# Patient Record
Sex: FEMALE | Race: WHITE | Employment: FULL TIME
[De-identification: names, ages, dates, MRNs, and addresses within clinical notes are randomized per-mention and may not be internally consistent; named-entity substitution may affect disease eponyms.]

---

## 2022-10-11 PROBLEM — E66.812 CLASS 2 OBESITY DUE TO EXCESS CALORIES WITHOUT SERIOUS COMORBIDITY WITH BODY MASS INDEX (BMI) OF 38.0 TO 38.9 IN ADULT: Status: ACTIVE | Noted: 2022-10-11

## 2024-09-03 ENCOUNTER — OFFICE VISIT (OUTPATIENT)
Dept: FAMILY MEDICINE CLINIC | Facility: CLINIC | Age: 34
End: 2024-09-03

## 2024-09-03 VITALS
BODY MASS INDEX: 37.61 KG/M2 | DIASTOLIC BLOOD PRESSURE: 71 MMHG | HEART RATE: 90 BPM | WEIGHT: 212.25 LBS | TEMPERATURE: 97.3 F | SYSTOLIC BLOOD PRESSURE: 114 MMHG | HEIGHT: 63 IN | OXYGEN SATURATION: 93 %

## 2024-09-03 DIAGNOSIS — G56.03 CARPAL TUNNEL SYNDROME, BILATERAL: ICD-10-CM

## 2024-09-03 DIAGNOSIS — M25.532 BILATERAL WRIST PAIN: Primary | ICD-10-CM

## 2024-09-03 DIAGNOSIS — L02.211 CUTANEOUS ABSCESS OF ABDOMINAL WALL: ICD-10-CM

## 2024-09-03 DIAGNOSIS — M25.531 BILATERAL WRIST PAIN: Primary | ICD-10-CM

## 2024-09-03 DIAGNOSIS — R20.2 PARESTHESIA OF HAND, BILATERAL: ICD-10-CM

## 2024-09-03 PROCEDURE — 99214 OFFICE O/P EST MOD 30 MIN: CPT | Performed by: PHYSICIAN ASSISTANT

## 2024-09-03 RX ORDER — SULFAMETHOXAZOLE/TRIMETHOPRIM 800-160 MG
1 TABLET ORAL 2 TIMES DAILY
Qty: 14 TABLET | Refills: 0 | Status: SHIPPED | OUTPATIENT
Start: 2024-09-03 | End: 2024-09-10

## 2024-09-03 RX ORDER — FLUTICASONE PROPIONATE 50 MCG
1 SPRAY, SUSPENSION (ML) NASAL 2 TIMES DAILY PRN
COMMUNITY
Start: 2024-07-02

## 2024-09-03 SDOH — ECONOMIC STABILITY: INCOME INSECURITY: HOW HARD IS IT FOR YOU TO PAY FOR THE VERY BASICS LIKE FOOD, HOUSING, MEDICAL CARE, AND HEATING?: NOT HARD AT ALL

## 2024-09-03 SDOH — ECONOMIC STABILITY: FOOD INSECURITY: WITHIN THE PAST 12 MONTHS, YOU WORRIED THAT YOUR FOOD WOULD RUN OUT BEFORE YOU GOT MONEY TO BUY MORE.: NEVER TRUE

## 2024-09-03 SDOH — ECONOMIC STABILITY: FOOD INSECURITY: WITHIN THE PAST 12 MONTHS, THE FOOD YOU BOUGHT JUST DIDN'T LAST AND YOU DIDN'T HAVE MONEY TO GET MORE.: NEVER TRUE

## 2024-09-03 ASSESSMENT — ENCOUNTER SYMPTOMS
ABDOMINAL PAIN: 0
RESPIRATORY NEGATIVE: 1
DIARRHEA: 0
VOMITING: 0
NAUSEA: 0

## 2024-09-03 ASSESSMENT — PATIENT HEALTH QUESTIONNAIRE - PHQ9
SUM OF ALL RESPONSES TO PHQ9 QUESTIONS 1 & 2: 0
1. LITTLE INTEREST OR PLEASURE IN DOING THINGS: NOT AT ALL
SUM OF ALL RESPONSES TO PHQ QUESTIONS 1-9: 0
2. FEELING DOWN, DEPRESSED OR HOPELESS: NOT AT ALL
SUM OF ALL RESPONSES TO PHQ QUESTIONS 1-9: 0
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
## 2025-03-03 SDOH — HEALTH STABILITY: PHYSICAL HEALTH: ON AVERAGE, HOW MANY DAYS PER WEEK DO YOU ENGAGE IN MODERATE TO STRENUOUS EXERCISE (LIKE A BRISK WALK)?: 4 DAYS

## 2025-03-03 SDOH — HEALTH STABILITY: PHYSICAL HEALTH: ON AVERAGE, HOW MANY MINUTES DO YOU ENGAGE IN EXERCISE AT THIS LEVEL?: 40 MIN

## 2025-03-04 ENCOUNTER — OFFICE VISIT (OUTPATIENT)
Dept: PRIMARY CARE CLINIC | Facility: CLINIC | Age: 35
End: 2025-03-04
Payer: COMMERCIAL

## 2025-03-04 ENCOUNTER — PATIENT MESSAGE (OUTPATIENT)
Dept: PRIMARY CARE CLINIC | Facility: CLINIC | Age: 35
End: 2025-03-04

## 2025-03-04 VITALS
BODY MASS INDEX: 39.69 KG/M2 | WEIGHT: 224 LBS | HEIGHT: 63 IN | SYSTOLIC BLOOD PRESSURE: 109 MMHG | TEMPERATURE: 97.9 F | DIASTOLIC BLOOD PRESSURE: 72 MMHG | OXYGEN SATURATION: 95 % | HEART RATE: 91 BPM

## 2025-03-04 DIAGNOSIS — R53.83 OTHER FATIGUE: ICD-10-CM

## 2025-03-04 DIAGNOSIS — Z13.21 ENCOUNTER FOR VITAMIN DEFICIENCY SCREENING: ICD-10-CM

## 2025-03-04 DIAGNOSIS — E55.9 VITAMIN D DEFICIENCY: ICD-10-CM

## 2025-03-04 DIAGNOSIS — R79.9 ABNORMAL BLOOD CHEMISTRY: ICD-10-CM

## 2025-03-04 DIAGNOSIS — Z13.29 SCREENING FOR THYROID DISORDER: ICD-10-CM

## 2025-03-04 DIAGNOSIS — Z13.1 SCREENING FOR DIABETES MELLITUS: ICD-10-CM

## 2025-03-04 DIAGNOSIS — Z13.220 SCREENING FOR LIPID DISORDERS: ICD-10-CM

## 2025-03-04 DIAGNOSIS — R53.81 MALAISE AND FATIGUE: ICD-10-CM

## 2025-03-04 DIAGNOSIS — Z13.0 SCREENING FOR IRON DEFICIENCY ANEMIA: ICD-10-CM

## 2025-03-04 DIAGNOSIS — E66.09 CLASS 2 OBESITY DUE TO EXCESS CALORIES WITHOUT SERIOUS COMORBIDITY WITH BODY MASS INDEX (BMI) OF 38.0 TO 38.9 IN ADULT: ICD-10-CM

## 2025-03-04 DIAGNOSIS — Z79.899 ENCOUNTER FOR LONG-TERM (CURRENT) USE OF MEDICATIONS: ICD-10-CM

## 2025-03-04 DIAGNOSIS — R79.89 OTHER SPECIFIED ABNORMAL FINDINGS OF BLOOD CHEMISTRY: ICD-10-CM

## 2025-03-04 DIAGNOSIS — R53.81 MALAISE: ICD-10-CM

## 2025-03-04 DIAGNOSIS — R73.09 OTHER ABNORMAL GLUCOSE: ICD-10-CM

## 2025-03-04 DIAGNOSIS — F41.9 ANXIETY AND DEPRESSION: ICD-10-CM

## 2025-03-04 DIAGNOSIS — R79.0 LOW MAGNESIUM LEVEL: ICD-10-CM

## 2025-03-04 DIAGNOSIS — F32.A ANXIETY AND DEPRESSION: ICD-10-CM

## 2025-03-04 DIAGNOSIS — Z76.89 ENCOUNTER TO ESTABLISH CARE: Primary | ICD-10-CM

## 2025-03-04 DIAGNOSIS — G89.29 CHRONIC RIGHT-SIDED LOW BACK PAIN WITH RIGHT-SIDED SCIATICA: ICD-10-CM

## 2025-03-04 DIAGNOSIS — Z13.228 SCREENING FOR METABOLIC DISORDER: ICD-10-CM

## 2025-03-04 DIAGNOSIS — K21.9 GASTROESOPHAGEAL REFLUX DISEASE WITHOUT ESOPHAGITIS: ICD-10-CM

## 2025-03-04 DIAGNOSIS — Z13.21 SCREENING FOR ENDOCRINE, NUTRITIONAL, METABOLIC AND IMMUNITY DISORDER: ICD-10-CM

## 2025-03-04 DIAGNOSIS — M54.41 CHRONIC RIGHT-SIDED LOW BACK PAIN WITH RIGHT-SIDED SCIATICA: ICD-10-CM

## 2025-03-04 DIAGNOSIS — G43.109 MIGRAINE WITH AURA AND WITHOUT STATUS MIGRAINOSUS, NOT INTRACTABLE: ICD-10-CM

## 2025-03-04 DIAGNOSIS — Z13.228 SCREENING FOR ENDOCRINE, NUTRITIONAL, METABOLIC AND IMMUNITY DISORDER: ICD-10-CM

## 2025-03-04 DIAGNOSIS — Z13.0 SCREENING FOR DEFICIENCY ANEMIA: ICD-10-CM

## 2025-03-04 DIAGNOSIS — R53.82 CHRONIC FATIGUE: ICD-10-CM

## 2025-03-04 DIAGNOSIS — E66.812 CLASS 2 OBESITY DUE TO EXCESS CALORIES WITHOUT SERIOUS COMORBIDITY WITH BODY MASS INDEX (BMI) OF 38.0 TO 38.9 IN ADULT: ICD-10-CM

## 2025-03-04 DIAGNOSIS — Z13.29 SCREENING FOR ENDOCRINE, NUTRITIONAL, METABOLIC AND IMMUNITY DISORDER: ICD-10-CM

## 2025-03-04 DIAGNOSIS — R53.83 MALAISE AND FATIGUE: ICD-10-CM

## 2025-03-04 DIAGNOSIS — R59.9 ADENOPATHY: ICD-10-CM

## 2025-03-04 DIAGNOSIS — Z13.0 SCREENING FOR ENDOCRINE, NUTRITIONAL, METABOLIC AND IMMUNITY DISORDER: ICD-10-CM

## 2025-03-04 LAB
25(OH)D3 SERPL-MCNC: 23.3 NG/ML (ref 30–100)
ALBUMIN SERPL-MCNC: 3.7 G/DL (ref 3.5–5)
ALBUMIN/GLOB SERPL: 1.2 (ref 1–1.9)
ALP SERPL-CCNC: 73 U/L (ref 35–104)
ALT SERPL-CCNC: 36 U/L (ref 8–45)
ANION GAP SERPL CALC-SCNC: 10 MMOL/L (ref 7–16)
AST SERPL-CCNC: 25 U/L (ref 15–37)
BASOPHILS # BLD: 0.05 K/UL (ref 0–0.2)
BASOPHILS NFR BLD: 0.6 % (ref 0–2)
BILIRUB SERPL-MCNC: 0.2 MG/DL (ref 0–1.2)
BUN SERPL-MCNC: 13 MG/DL (ref 6–23)
CALCIUM SERPL-MCNC: 9.2 MG/DL (ref 8.8–10.2)
CHLORIDE SERPL-SCNC: 106 MMOL/L (ref 98–107)
CHOLEST SERPL-MCNC: 195 MG/DL (ref 0–200)
CO2 SERPL-SCNC: 26 MMOL/L (ref 20–29)
CREAT SERPL-MCNC: 0.7 MG/DL (ref 0.6–1.1)
DIFFERENTIAL METHOD BLD: NORMAL
EOSINOPHIL # BLD: 0.1 K/UL (ref 0–0.8)
EOSINOPHIL NFR BLD: 1.2 % (ref 0.5–7.8)
ERYTHROCYTE [DISTWIDTH] IN BLOOD BY AUTOMATED COUNT: 12.7 % (ref 11.9–14.6)
EST. AVERAGE GLUCOSE BLD GHB EST-MCNC: 99 MG/DL
FERRITIN SERPL-MCNC: 58 NG/ML (ref 8–388)
FOLATE SERPL-MCNC: 7.8 NG/ML (ref 3.1–17.5)
GLOBULIN SER CALC-MCNC: 3.1 G/DL (ref 2.3–3.5)
GLUCOSE SERPL-MCNC: 97 MG/DL (ref 70–99)
HBA1C MFR BLD: 5.1 % (ref 0–5.6)
HCT VFR BLD AUTO: 41.6 % (ref 35.8–46.3)
HDLC SERPL-MCNC: 52 MG/DL (ref 40–60)
HDLC SERPL: 3.8 (ref 0–5)
HGB BLD-MCNC: 13.9 G/DL (ref 11.7–15.4)
IMM GRANULOCYTES # BLD AUTO: 0.03 K/UL (ref 0–0.5)
IMM GRANULOCYTES NFR BLD AUTO: 0.4 % (ref 0–5)
IRON SATN MFR SERPL: 16 % (ref 20–50)
IRON SERPL-MCNC: 52 UG/DL (ref 35–100)
LDLC SERPL CALC-MCNC: 112 MG/DL (ref 0–100)
LYMPHOCYTES # BLD: 1.84 K/UL (ref 0.5–4.6)
LYMPHOCYTES NFR BLD: 21.6 % (ref 13–44)
MAGNESIUM SERPL-MCNC: 2.1 MG/DL (ref 1.8–2.4)
MCH RBC QN AUTO: 31.9 PG (ref 26.1–32.9)
MCHC RBC AUTO-ENTMCNC: 33.4 G/DL (ref 31.4–35)
MCV RBC AUTO: 95.4 FL (ref 82–102)
MONOCYTES # BLD: 0.5 K/UL (ref 0.1–1.3)
MONOCYTES NFR BLD: 5.9 % (ref 4–12)
NEUTS SEG # BLD: 6.01 K/UL (ref 1.7–8.2)
NEUTS SEG NFR BLD: 70.3 % (ref 43–78)
NRBC # BLD: 0 K/UL (ref 0–0.2)
PLATELET # BLD AUTO: 287 K/UL (ref 150–450)
PMV BLD AUTO: 9.7 FL (ref 9.4–12.3)
POTASSIUM SERPL-SCNC: 4 MMOL/L (ref 3.5–5.1)
PROT SERPL-MCNC: 6.8 G/DL (ref 6.3–8.2)
RBC # BLD AUTO: 4.36 M/UL (ref 4.05–5.2)
SODIUM SERPL-SCNC: 141 MMOL/L (ref 136–145)
TIBC SERPL-MCNC: 318 UG/DL (ref 240–450)
TRIGL SERPL-MCNC: 157 MG/DL (ref 0–150)
TSH W FREE THYROID IF ABNORMAL: 1.1 UIU/ML (ref 0.27–4.2)
UIBC SERPL-MCNC: 266 UG/DL (ref 112–347)
VIT B12 SERPL-MCNC: 549 PG/ML (ref 193–986)
VLDLC SERPL CALC-MCNC: 31 MG/DL (ref 6–23)
WBC # BLD AUTO: 8.5 K/UL (ref 4.3–11.1)

## 2025-03-04 PROCEDURE — 99214 OFFICE O/P EST MOD 30 MIN: CPT | Performed by: NURSE PRACTITIONER

## 2025-03-04 RX ORDER — FAMOTIDINE 20 MG/1
20 TABLET, FILM COATED ORAL 2 TIMES DAILY PRN
Qty: 180 TABLET | Refills: 1 | Status: SHIPPED | OUTPATIENT
Start: 2025-03-04 | End: 2025-08-31

## 2025-03-04 SDOH — ECONOMIC STABILITY: FOOD INSECURITY: WITHIN THE PAST 12 MONTHS, YOU WORRIED THAT YOUR FOOD WOULD RUN OUT BEFORE YOU GOT MONEY TO BUY MORE.: NEVER TRUE

## 2025-03-04 SDOH — ECONOMIC STABILITY: FOOD INSECURITY: WITHIN THE PAST 12 MONTHS, THE FOOD YOU BOUGHT JUST DIDN'T LAST AND YOU DIDN'T HAVE MONEY TO GET MORE.: NEVER TRUE

## 2025-03-04 ASSESSMENT — ENCOUNTER SYMPTOMS
NAUSEA: 0
ABDOMINAL PAIN: 0
VOMITING: 0
CONSTIPATION: 0
EYES NEGATIVE: 1
SHORTNESS OF BREATH: 0
DIARRHEA: 0
CHEST TIGHTNESS: 0
BACK PAIN: 1
ALLERGIC/IMMUNOLOGIC NEGATIVE: 1
RESPIRATORY NEGATIVE: 1

## 2025-03-04 ASSESSMENT — PATIENT HEALTH QUESTIONNAIRE - PHQ9
SUM OF ALL RESPONSES TO PHQ QUESTIONS 1-9: 2
SUM OF ALL RESPONSES TO PHQ QUESTIONS 1-9: 2
3. TROUBLE FALLING OR STAYING ASLEEP: NOT AT ALL
9. THOUGHTS THAT YOU WOULD BE BETTER OFF DEAD, OR OF HURTING YOURSELF: NOT AT ALL
SUM OF ALL RESPONSES TO PHQ QUESTIONS 1-9: 2
SUM OF ALL RESPONSES TO PHQ QUESTIONS 1-9: 2
8. MOVING OR SPEAKING SO SLOWLY THAT OTHER PEOPLE COULD HAVE NOTICED. OR THE OPPOSITE, BEING SO FIGETY OR RESTLESS THAT YOU HAVE BEEN MOVING AROUND A LOT MORE THAN USUAL: NOT AT ALL
7. TROUBLE CONCENTRATING ON THINGS, SUCH AS READING THE NEWSPAPER OR WATCHING TELEVISION: NOT AT ALL
4. FEELING TIRED OR HAVING LITTLE ENERGY: NOT AT ALL
2. FEELING DOWN, DEPRESSED OR HOPELESS: SEVERAL DAYS
1. LITTLE INTEREST OR PLEASURE IN DOING THINGS: SEVERAL DAYS
10. IF YOU CHECKED OFF ANY PROBLEMS, HOW DIFFICULT HAVE THESE PROBLEMS MADE IT FOR YOU TO DO YOUR WORK, TAKE CARE OF THINGS AT HOME, OR GET ALONG WITH OTHER PEOPLE: SOMEWHAT DIFFICULT
6. FEELING BAD ABOUT YOURSELF - OR THAT YOU ARE A FAILURE OR HAVE LET YOURSELF OR YOUR FAMILY DOWN: NOT AT ALL
5. POOR APPETITE OR OVEREATING: NOT AT ALL

## 2025-03-04 NOTE — ASSESSMENT & PLAN NOTE
She has an appointment with Ortho for her hand/wrist.  She will reach out to them to see if she can be seen for her back without the new referral.  Stable.  Chronic.  Continue current treatment plan  Has tried diclofenac in the past    -------------------------------------------------------------------------------------------------------------------------------------   03/2023 - CT Lumbar Spine:    FINDINGS: There is preservation of vertebral body height and alignment. No   fracture is identified. Left and right periaortic lymph nodes are identified   with dominant right aortocaval lymph node measuring 1.0 x 0.7 cm, left   periaortic lymph node measuring 1.0 x 1.2 cm, left common iliac lymph node   measuring 1.0 x 0.6 cm without considerable change since previous exam.  At   L5-S1, there is a broad-based disc bulge and calcification along the margin of   the posterior disc. The lateral recesses appear patent.  At L4-5, there is a   mild broad-based disc bulge with mild lateral recess stenosis with mild facet   hypertrophic changes.  The L3-4, L2-3, L1-2 and T12-L1 vertebral body levels   appear normal. The paravertebral region appears unremarkable.     IMPRESSION:     1.  DISC DISEASE WITH CALCIFIED MARGIN IN THE L5-S1 DISC AS DESCRIBED SUGGESTIVE   OF A MORE CHRONIC PROCESS.  MILDER DISC SEEN AT L4-5.     2.  MILD STABLE RETROPERITONEAL ADENOPATHY.

## 2025-03-04 NOTE — ASSESSMENT & PLAN NOTE
Stable.  Chronic.  Interested in injectables but labs will be checked first  Has tried and failed diet, exercise, lifestyle changes

## 2025-03-04 NOTE — ASSESSMENT & PLAN NOTE
On famotidine 20 mg p.o. twice daily  Has not seen GI    Indigestion: Advised to take famotidine daily for a couple of weeks. Prescription sent to [facility].  - Consider endoscopy if symptoms persist.    GERD  Chronic. Stable/Well-Controlled  Labs: Mg, Vit B12 with annual labs.   Medication: See above  Discussed warning S&S r/t medication usage. Discussed SE, AR, AE.  Tolerating medication well. No SE, AR, AE. Benefits outweigh risks. Prefers to continue medication as currently prescribed.  Encourage appropriate rest and fluid intake  Encourage lifestyle changes, including healthy eating, exercise, limiting alcohol/tobacco use, and stress reducers.  F/u in 6 months for re-evaluation, unless an earlier f/u is required. If improvement is noted, we will provide additional refills.    General Measures  Elevate HOB  Avoid meals 2 to 3 hours before bedtime  Avoid stooping, bending, and tight-fitting garments  Avoid medications that relax LES (anticholinergic drugs; CCB)  Promoted weight loss  Avoid tobacco use and alcohol  Limit consumption of patient-specific food triggers  Track symptoms over time

## 2025-03-04 NOTE — PROGRESS NOTES
Chronic.  Continue current treatment plan  25. Migraine with aura and without status migrainosus, not intractable  Assessment & Plan:  Not on meds  Stable.  Chronic.  Continue current treatment plan        I have reviewed the patient's past medical history, social history and family history and vitals.  We have discussed treatment plan and follow up and given patient instructions.  Patient's questions are answered and we will follow up as indicated.    Return in about 3 months (around 6/4/2025) for VV is okay.     MALLIKA Hinojosa - BILL    ADDITIONAL EDUCATION    Last 7 days of labs:    No visits with results within 1 Week(s) from this visit.   Latest known visit with results is:   Orders Only on 04/06/2023   Component Date Value Ref Range Status    HIV 1/2 Interp 04/06/2023 NONREACTIVE  NONREACTIVE   Final    HIV 1/2 Result Comment 04/06/2023 SEE NOTE    Final    Comment: While this assay is highly sensitive, a non-reactive/negative result for HIV antibodies HIV-1 and HIV-2 and p24 antigen, does not exclude the possibility of exposure to or infection with HIV.  HIV antibodies and/or p24 antigen may be undetectable in some stages of the infection and in some clinical conditions.  Test performed by the [employer] HIV Ag/Ab Combo (CHIV), 4th generation assay.  Recommend consulting relevant CDC guidelines for informing test subject of the result and its interpretation.      Cholesterol, Total 04/06/2023 181  <200 MG/DL Final    Comment: Borderline High: 200-239 mg/dL  High: Greater than or equal to 240 mg/dL      Triglycerides 04/06/2023 78  35 - 150 MG/DL Final    Comment: Borderline High: 150-199 mg/dL, High: 200-499 mg/dL  Very High: Greater than or equal to 500 mg/dL      HDL 04/06/2023 57  40 - 60 MG/DL Final    LDL Calculated 04/06/2023 108.4 (H)  <100 MG/DL Final    Comment: Near Optimal: 100-129 mg/dL  Borderline High: 130-159, High: 160-189 mg/dL  Very High: Greater than or equal to 190 mg/dL      VLDL

## 2025-03-04 NOTE — ASSESSMENT & PLAN NOTE
Abnormal CT in 2023  Will repeat both with CT abdomen and pelvis  Otherwise stable.  Chronic.  Continue current treatment plan    03/2023 - CT Lumbar Spine:    FINDINGS: There is preservation of vertebral body height and alignment. No   fracture is identified. Left and right periaortic lymph nodes are identified   with dominant right aortocaval lymph node measuring 1.0 x 0.7 cm, left   periaortic lymph node measuring 1.0 x 1.2 cm, left common iliac lymph node   measuring 1.0 x 0.6 cm without considerable change since previous exam.  At   L5-S1, there is a broad-based disc bulge and calcification along the margin of   the posterior disc. The lateral recesses appear patent.  At L4-5, there is a   mild broad-based disc bulge with mild lateral recess stenosis with mild facet   hypertrophic changes.  The L3-4, L2-3, L1-2 and T12-L1 vertebral body levels   appear normal. The paravertebral region appears unremarkable.     IMPRESSION:     1.  DISC DISEASE WITH CALCIFIED MARGIN IN THE L5-S1 DISC AS DESCRIBED SUGGESTIVE   OF A MORE CHRONIC PROCESS.  MILDER DISC SEEN AT L4-5.     2.  MILD STABLE RETROPERITONEAL ADENOPATHY.

## 2025-03-05 DIAGNOSIS — E78.2 MIXED HYPERLIPIDEMIA: Primary | ICD-10-CM

## 2025-03-05 DIAGNOSIS — F32.A ANXIETY AND DEPRESSION: ICD-10-CM

## 2025-03-05 DIAGNOSIS — E66.09 CLASS 2 OBESITY DUE TO EXCESS CALORIES WITHOUT SERIOUS COMORBIDITY WITH BODY MASS INDEX (BMI) OF 38.0 TO 38.9 IN ADULT: Primary | ICD-10-CM

## 2025-03-05 DIAGNOSIS — E78.2 MIXED HYPERLIPIDEMIA: ICD-10-CM

## 2025-03-05 DIAGNOSIS — E66.812 CLASS 2 OBESITY DUE TO EXCESS CALORIES WITHOUT SERIOUS COMORBIDITY WITH BODY MASS INDEX (BMI) OF 38.0 TO 38.9 IN ADULT: Primary | ICD-10-CM

## 2025-03-05 DIAGNOSIS — F41.9 ANXIETY AND DEPRESSION: ICD-10-CM

## 2025-03-05 PROBLEM — E55.9 VITAMIN D DEFICIENCY: Status: ACTIVE | Noted: 2025-03-05

## 2025-03-05 RX ORDER — OMEGA-3/DHA/EPA/FISH OIL 300-1000MG
1 CAPSULE ORAL DAILY
Qty: 90 CAPSULE | Refills: 1 | Status: SHIPPED | OUTPATIENT
Start: 2025-03-05

## 2025-03-05 NOTE — RESULT ENCOUNTER NOTE
Please let the patient know:    Lab Results & Recommendations    Your recent lab results mostly look good, but there are a few areas we should address to keep you as healthy as possible. Below is a breakdown:    Normal Labs:  · Blood count (WBC, RBC, hemoglobin, hematocrit, platelets) - No signs of anemia or infection.  · Kidney and liver function - Everything is within normal limits.  · Blood sugar (Glucose 97, A1C 5.1%) - No signs of diabetes.  · Electrolytes (Sodium, potassium, chloride, magnesium, calcium) - All are balanced.  · Iron levels - While within range, your iron saturation is slightly low (16%), which may indicate mild iron deficiency.  · Thyroid function (TSH 1.10) - Normal.    Areas of Concern & Recommendations:    Cholesterol & Heart Health  · Triglycerides (157 mg/dL) - Slightly high  · LDL (\"bad\" cholesterol) (112 mg/dL) - Above ideal  · VLDL (31 mg/dL) - Elevated  Recommendation:  · Start focusing on heart-healthy habits, including:  º Diet: Increase fiber (vegetables, whole grains), reduce processed foods, fried foods, and added sugars.  º Exercise: Aim for at least 150 minutes per week of moderate activity (like brisk walking).  º Consider omega-3 supplementation or increasing fatty fish intake (salmon, tuna, mackerel).  º If lifestyle changes don't improve cholesterol, we can discuss medication options in the future.    Vitamin D Deficiency (23.3 ng/mL - LOW)  Recommendation:  · Start a Vitamin D3 supplement (5540-5692 IU daily) to improve levels.  · Spend 15-20 minutes in sunlight daily, if possible.  · We'll recheck levels in 3 months to ensure improvement.    Iron Levels & Fatigue Prevention  · Iron saturation is a little low (16%) but other iron markers are normal.  Recommendation:  · Eat more iron-rich foods (lean meats, spinach, beans).  · Pair iron-rich foods with Vitamin C (citrus fruits, bell peppers) for better absorption.    Next Steps  Start Vitamin D3 daily (2041-6432 IU).  Make

## 2025-03-12 DIAGNOSIS — E55.9 VITAMIN D DEFICIENCY: ICD-10-CM

## 2025-03-12 DIAGNOSIS — E66.09 CLASS 2 OBESITY DUE TO EXCESS CALORIES WITHOUT SERIOUS COMORBIDITY WITH BODY MASS INDEX (BMI) OF 38.0 TO 38.9 IN ADULT: Primary | ICD-10-CM

## 2025-03-12 DIAGNOSIS — E66.812 CLASS 2 OBESITY DUE TO EXCESS CALORIES WITHOUT SERIOUS COMORBIDITY WITH BODY MASS INDEX (BMI) OF 38.0 TO 38.9 IN ADULT: Primary | ICD-10-CM

## 2025-03-12 DIAGNOSIS — K21.9 GASTROESOPHAGEAL REFLUX DISEASE WITHOUT ESOPHAGITIS: ICD-10-CM

## 2025-03-12 DIAGNOSIS — E78.2 MIXED HYPERLIPIDEMIA: ICD-10-CM

## 2025-03-19 ENCOUNTER — E-VISIT (OUTPATIENT)
Dept: PRIMARY CARE CLINIC | Facility: CLINIC | Age: 35
End: 2025-03-19
Payer: COMMERCIAL

## 2025-03-19 DIAGNOSIS — L03.039 CELLULITIS OF TOE, UNSPECIFIED LATERALITY: ICD-10-CM

## 2025-03-19 DIAGNOSIS — T36.95XA ANTIBIOTIC-INDUCED YEAST INFECTION: Primary | ICD-10-CM

## 2025-03-19 DIAGNOSIS — B37.9 ANTIBIOTIC-INDUCED YEAST INFECTION: Primary | ICD-10-CM

## 2025-03-19 DIAGNOSIS — B35.3 TINEA PEDIS, UNSPECIFIED LATERALITY: ICD-10-CM

## 2025-03-19 PROCEDURE — 99422 OL DIG E/M SVC 11-20 MIN: CPT | Performed by: NURSE PRACTITIONER

## 2025-03-19 RX ORDER — MUPIROCIN 20 MG/G
OINTMENT TOPICAL
Qty: 30 G | Refills: 1 | Status: SHIPPED | OUTPATIENT
Start: 2025-03-19 | End: 2025-03-26

## 2025-03-19 RX ORDER — FLUCONAZOLE 150 MG/1
150 TABLET ORAL
Qty: 3 TABLET | Refills: 0 | Status: SHIPPED | OUTPATIENT
Start: 2025-03-19 | End: 2025-03-28

## 2025-03-19 NOTE — PROGRESS NOTES
Norma Lutz (1990) initiated an asynchronous digital communication through "[employer]".    HPI: per patient questionnaire     Exam: not applicable    Diagnoses and all orders for this visit:  Diagnoses and all orders for this visit:    Antibiotic-induced yeast infection  -     fluconazole (DIFLUCAN) 150 MG tablet; Take 1 tablet by mouth every 72 hours for 9 days    Tinea pedis, unspecified laterality  -     mupirocin (BACTROBAN) 2 % ointment; Apply topically 3 times daily.  -     fluconazole (DIFLUCAN) 150 MG tablet; Take 1 tablet by mouth every 72 hours for 9 days    Cellulitis of toe, unspecified laterality  -     mupirocin (BACTROBAN) 2 % ointment; Apply topically 3 times daily.  -     fluconazole (DIFLUCAN) 150 MG tablet; Take 1 tablet by mouth every 72 hours for 9 days          Time: EV2 - 11-20 minutes were spent on the digital evaluation and management of this patient.     MALLIKA Hinojosa - NP    I see that there is a small dark spot on the bottom of the foot. I can offer some general possibilities based on appearance:  Possible Causes:  Plantar Wart (Verruca Plantaris) - Caused by HPV, these often have a rough texture and may have tiny black dots (clotted blood vessels).  Foreign Body (Splinter or Glass) - A small puncture wound with trapped debris can appear dark.  Healing Blister or Blood Spot - If there was previous friction or minor trauma, it could be dried blood under the skin.  Mole or Freckle - If it has been there for a while and hasn’t changed, it may be a benign skin chung.  Melanoma (Less Common but Important to Rule Out) - If the spot is new, irregular in shape, changing in size, or has uneven coloring, it should be evaluated by Derm.      It looks like there is peeling or cracked skin between the toes, which could be due to several common conditions. Here are a few possibilities:  Possible Causes:  Athlete’s Foot (Tinea Pedis) - A fungal infection that thrives in moist areas, often causing

## 2025-03-26 ENCOUNTER — TELEMEDICINE (OUTPATIENT)
Dept: PRIMARY CARE CLINIC | Facility: CLINIC | Age: 35
End: 2025-03-26
Payer: COMMERCIAL

## 2025-03-26 DIAGNOSIS — F17.210 CIGARETTE NICOTINE DEPENDENCE WITHOUT COMPLICATION: ICD-10-CM

## 2025-03-26 DIAGNOSIS — L70.0 ACNE VULGARIS: ICD-10-CM

## 2025-03-26 DIAGNOSIS — F17.200 TOBACCO USE DISORDER: Primary | ICD-10-CM

## 2025-03-26 DIAGNOSIS — F17.210 CIGARETTE SMOKER: ICD-10-CM

## 2025-03-26 PROCEDURE — 99407 BEHAV CHNG SMOKING > 10 MIN: CPT | Performed by: NURSE PRACTITIONER

## 2025-03-26 PROCEDURE — 99214 OFFICE O/P EST MOD 30 MIN: CPT | Performed by: NURSE PRACTITIONER

## 2025-03-26 RX ORDER — VARENICLINE TARTRATE 0.5 (11)-1
KIT ORAL
Qty: 53 EACH | Refills: 0 | Status: SHIPPED | OUTPATIENT
Start: 2025-03-26 | End: 2025-04-23

## 2025-03-26 RX ORDER — ADAPALENE 0.1 G/100G
1 CREAM TOPICAL NIGHTLY PRN
Qty: 45 G | Refills: 1 | Status: SHIPPED | OUTPATIENT
Start: 2025-03-26

## 2025-03-26 RX ORDER — CLINDAMYCIN AND BENZOYL PEROXIDE 10; 50 MG/G; MG/G
1 GEL TOPICAL DAILY
Qty: 50 G | Refills: 1 | Status: SHIPPED | OUTPATIENT
Start: 2025-03-26

## 2025-03-26 ASSESSMENT — ENCOUNTER SYMPTOMS
BACK PAIN: 1
SHORTNESS OF BREATH: 0
CHEST TIGHTNESS: 0
ALLERGIC/IMMUNOLOGIC NEGATIVE: 1
NAUSEA: 0
DIARRHEA: 0
ABDOMINAL PAIN: 0
VOMITING: 0
EYES NEGATIVE: 1
CONSTIPATION: 0
RESPIRATORY NEGATIVE: 1

## 2025-03-26 NOTE — PROGRESS NOTES
3/26/2025    TELEHEALTH EVALUATION -- Audio/Visual    Tobacco Cessation Documentation    Clinical Justification  · Patient is motivated to quit smoking  · Previous attempts at quitting (Failed conservative therapies. Tried OTC patches, nicotine replacement, Sublingual, gum,cold turkey, nicotine patches, lozenge, bupropion, behavioral therapy and counseling etc.)  · Chantix is clinically appropriate based on patient history  · Patient has failed or cannot tolerate other smoking cessation therapies (e.g., nicotine replacement, bupropion)  · Presence of comorbidities that benefit from smoking cessation (e.g., GERD, migraine, adenopathy, BMI 39, anxiety depression, mixed hyperlipidemia)    Failed conservative therapies. Tried OTC patches, nicotine replacement, Sublingual, gum    Prescription Details  · Drug: Chantix (varenicline)  · Dosage: 0.5 mg and/or 1 mg tablets  · Instructions: Use standard titration schedule  · Duration: 12 weeks (may request an additional 12 weeks if needed)  · Quantity and refills based on treatment plan    Supporting Documentation (if required)  · Chart note indicating:  º Tobacco use - daily 1 PPD  º Readiness to quit - Yes  º Prior quit attempts and outcomes-multiple.  Unsure of dates.  Failed  · Statement that Chantix is medically necessary:    I am prescribing Chantix (varenicline) as part of a comprehensive smoking cessation plan for this patient. Chantix is medically necessary for the following reasons:  1. The patient has a documented history of daily tobacco use and has expressed a clear motivation to quit.  2. The patient has previously attempted smoking cessation using [list methods - e.g., nicotine replacement therapy, behavioral counseling, bupropion], with limited or no long-term success.  3. Chantix is an FDA-approved, evidence-based option for smoking cessation that works through partial agonism at nicotinic acetylcholine receptors. It has been shown to significantly increase

## 2025-03-26 NOTE — ASSESSMENT & PLAN NOTE
Tobacco Use    Discussed risks of smoking/chewing tobacco with patient in detail.  Patient IS willing to quit smoking at this time.  Discussed options for smoking cessation, including nicotine replacement products, bupropion, and varenicline.  Counseled patient to set a quit date and remove all tobacco products from the home.  Discussed the importance of involving friends/family in their decision to quit for continued support and accountability.  Patient verbalized understanding.  Patient does want to initiate a plan at today's visit. [unfilled] 13 MIN      Varenicline Tartrate, Starter, (CHANTIX STARTING MONTH PAK) 0.5 MG X 11 & 1 MG X 42 TBPK   Take 0.5 mg by mouth daily for 3 days, THEN 0.5 mg in the morning and at bedtime for 4 days, THEN 1 mg in the morning and at bedtime for 21 days., Disp-53 each, R-0 Dosing Schedule: Days 1-3: Take 0.5 mg once daily Days 4-7: Take 0.5 mg twice daily (one in the morning, one in the evening) Day 8 and beyond: Take 1 mg twice daily Normal

## 2025-05-14 ENCOUNTER — PROCEDURE VISIT (OUTPATIENT)
Dept: NEUROLOGY | Age: 35
End: 2025-05-14
Payer: COMMERCIAL

## 2025-05-14 VITALS — OXYGEN SATURATION: 98 % | HEIGHT: 63 IN | WEIGHT: 220 LBS | HEART RATE: 85 BPM | BODY MASS INDEX: 38.98 KG/M2

## 2025-05-14 DIAGNOSIS — R20.2 NUMBNESS AND TINGLING IN BOTH HANDS: Primary | ICD-10-CM

## 2025-05-14 DIAGNOSIS — R20.0 NUMBNESS AND TINGLING IN BOTH HANDS: Primary | ICD-10-CM

## 2025-05-14 PROBLEM — R94.131 ABNORMAL EMG: Status: ACTIVE | Noted: 2025-05-14

## 2025-05-14 PROCEDURE — 95885 MUSC TST DONE W/NERV TST LIM: CPT | Performed by: PSYCHIATRY & NEUROLOGY

## 2025-05-14 PROCEDURE — 95913 NRV CNDJ TEST 13/> STUDIES: CPT | Performed by: PSYCHIATRY & NEUROLOGY

## 2025-05-14 NOTE — PROGRESS NOTES
EMG/Nerve Conduction Study Procedure Note  [address]    [address]  [place]  [address]   [phone]      Hx:    Exam:     35 y.o.  mw female     EMG::   BUE. CTS features.  No atrophy.  Equivocal Tinel's.  No Ciro.  Referring: ERIC Londono     Technologist ::   Etta Leone  Hgt:   5 foot 3 inch           Summary     needle EMG selected muscles hand with CV.                 Controlled environmental factors / EMG lab.  Temperature.   NCV : sensory segments:        Abnormal slowed right median SCV with attenuated SNAP.  Normal left median SCV as well as bilateral ulnar bilateral radial SCV.  NCV transcarpal sensory segments: Slowed bilateral transcarpal median SCV worse on the right.  Peak difference of latency on the right at 0.94 ms (UL = 0.20 ms).  Normal on the left.        NCV Motor MCV segments:   Abnormal right ulnar to the ADM = slowed at the cubital segment with conduction blocking.  Normal left ulnar MCV.  Normal bilateral median MCV.  F-wave studies:    Abnormal prolonged right ulnar   F waves.  Normal left ulnar and bilateral median F waves.   NEEDLE EMG:   Tested muscles::    Normal right FCU FDI APB   -normal left FDI APB.             INTERPRETATION:      ELECTROPHYSIOLOGIC EVIDENCE OF MILD ENTRAPMENT OF THE MEDIAN NERVE AT THE CARPAL SEGMENT ON THE RIGHT MORE THAN LEFT.  THERE IS MILD-MODERATE ENTRAPMENT AT THE CUBITAL ULNAR SEGMENT ON THE RIGHT.  NO AXONAL DENERVATION.           CONCLUSION:    Moderate right cubital tunnel syndrome.  Mild right carpal tunnel syndrome.                Procedure Details:   The above correlates with her clinical situation.  Further clinical correlation warranted.    Patient made aware.            Please Note::     Data and waveforms * filed under Procedure.    See Procedure Files for data pages.       [ *NOTE:  parts or all of this report are produced using artificial voice recognition software.  Some speech errors are inherent in such software

## 2025-05-19 ENCOUNTER — PATIENT MESSAGE (OUTPATIENT)
Dept: PRIMARY CARE CLINIC | Facility: CLINIC | Age: 35
End: 2025-05-19

## 2025-05-20 ENCOUNTER — TELEMEDICINE (OUTPATIENT)
Dept: PRIMARY CARE CLINIC | Facility: CLINIC | Age: 35
End: 2025-05-20
Payer: COMMERCIAL

## 2025-05-20 DIAGNOSIS — E66.812 CLASS 2 OBESITY DUE TO EXCESS CALORIES WITHOUT SERIOUS COMORBIDITY WITH BODY MASS INDEX (BMI) OF 38.0 TO 38.9 IN ADULT: ICD-10-CM

## 2025-05-20 DIAGNOSIS — E66.09 CLASS 2 OBESITY DUE TO EXCESS CALORIES WITHOUT SERIOUS COMORBIDITY WITH BODY MASS INDEX (BMI) OF 38.0 TO 38.9 IN ADULT: ICD-10-CM

## 2025-05-20 PROCEDURE — 99213 OFFICE O/P EST LOW 20 MIN: CPT | Performed by: NURSE PRACTITIONER

## 2025-05-20 RX ORDER — PHENTERMINE HYDROCHLORIDE 37.5 MG/1
37.5 TABLET ORAL
Qty: 30 TABLET | Refills: 2 | Status: SHIPPED | OUTPATIENT
Start: 2025-05-20 | End: 2025-08-18

## 2025-05-20 ASSESSMENT — ENCOUNTER SYMPTOMS
ABDOMINAL PAIN: 0
ALLERGIC/IMMUNOLOGIC NEGATIVE: 1
CONSTIPATION: 0
RESPIRATORY NEGATIVE: 1
VOMITING: 0
EYES NEGATIVE: 1
SHORTNESS OF BREATH: 0
CHEST TIGHTNESS: 0
NAUSEA: 0
BACK PAIN: 1
DIARRHEA: 0

## 2025-05-20 NOTE — ASSESSMENT & PLAN NOTE
Stable.  Chronic.  Interested in injectables but labs will be checked first  Has tried and failed diet, exercise, lifestyle changes    Will send phentermine PO QD  Will try Zepbound inj if insurance is agreeable  No longer on ozempic  No longer on liraglutide    She has received at least 3 sessions of at least 30 minutes of nutritional counseling with a dietitian and other healthcare professionals within the last 6 months  She has tried and failed over the 3-months of a low calorie diet as well as low-carb diet  BMI is greater than 30-current BMI is 38.97  She does have a history of mixed hyperlipidemia and tobacco dependence  She does not currently have a diagnosis of type 2 diabetes or sleep apnea  She does have a family history of premature heart disease  She does not currently have a diagnosis of high blood sugar for high blood pressure  She has no contraindications to this drug

## 2025-05-20 NOTE — PROGRESS NOTES
03/04/2025 36  8 - 45 U/L Final    AST 03/04/2025 25  15 - 37 U/L Final    Alk Phosphatase 03/04/2025 73  35 - 104 U/L Final    Total Protein 03/04/2025 6.8  6.3 - 8.2 g/dL Final    Albumin 03/04/2025 3.7  3.5 - 5.0 g/dL Final    Globulin 03/04/2025 3.1  2.3 - 3.5 g/dL Final    Albumin/Globulin Ratio 03/04/2025 1.2  1.0 - 1.9   Final    WBC 03/04/2025 8.5  4.3 - 11.1 K/uL Final    RBC 03/04/2025 4.36  4.05 - 5.2 M/uL Final    Hemoglobin 03/04/2025 13.9  11.7 - 15.4 g/dL Final    Hematocrit 03/04/2025 41.6  35.8 - 46.3 % Final    MCV 03/04/2025 95.4  82 - 102 FL Final    MCH 03/04/2025 31.9  26.1 - 32.9 PG Final    MCHC 03/04/2025 33.4  31.4 - 35.0 g/dL Final    RDW 03/04/2025 12.7  11.9 - 14.6 % Final    Platelets 03/04/2025 287  150 - 450 K/uL Final    MPV 03/04/2025 9.7  9.4 - 12.3 FL Final    nRBC 03/04/2025 0.00  0.0 - 0.2 K/uL Final    **Note: Absolute NRBC parameter is now reported with Hemogram**    Differential Type 03/04/2025 AUTOMATED    Final    Neutrophils % 03/04/2025 70.3  43.0 - 78.0 % Final    Lymphocytes % 03/04/2025 21.6  13.0 - 44.0 % Final    Monocytes % 03/04/2025 5.9  4.0 - 12.0 % Final    Eosinophils % 03/04/2025 1.2  0.5 - 7.8 % Final    Basophils % 03/04/2025 0.6  0.0 - 2.0 % Final    Immature Granulocytes % 03/04/2025 0.4  0.0 - 5.0 % Final    Neutrophils Absolute 03/04/2025 6.01  1.70 - 8.20 K/UL Final    Lymphocytes Absolute 03/04/2025 1.84  0.50 - 4.60 K/UL Final    Monocytes Absolute 03/04/2025 0.50  0.10 - 1.30 K/UL Final    Eosinophils Absolute 03/04/2025 0.10  0.00 - 0.80 K/UL Final    Basophils Absolute 03/04/2025 0.05  0.00 - 0.20 K/UL Final    Immature Granulocytes Absolute 03/04/2025 0.03  0.0 - 0.5 K/UL Final    Vitamin B-12 03/04/2025 549  193 - 986 pg/mL Final    Vit D, 25-Hydroxy 03/04/2025 23.3 (L)  30.0 - 100.0 ng/mL Final    Comment: Deficiency         <20.0 ng/mL  Insufficiency       20.0-29.9 ng/mL      TSH w Free Thyroid if Abnormal 03/04/2025 1.10  0.27 - 4.20 UIU/ML

## 2025-07-23 ENCOUNTER — TELEMEDICINE (OUTPATIENT)
Dept: PRIMARY CARE CLINIC | Facility: CLINIC | Age: 35
End: 2025-07-23
Payer: COMMERCIAL

## 2025-07-23 DIAGNOSIS — F41.9 ANXIETY AND DEPRESSION: ICD-10-CM

## 2025-07-23 DIAGNOSIS — F32.A ANXIETY AND DEPRESSION: ICD-10-CM

## 2025-07-23 DIAGNOSIS — E66.09 CLASS 2 OBESITY DUE TO EXCESS CALORIES WITHOUT SERIOUS COMORBIDITY WITH BODY MASS INDEX (BMI) OF 38.0 TO 38.9 IN ADULT: Primary | ICD-10-CM

## 2025-07-23 DIAGNOSIS — E66.812 CLASS 2 OBESITY DUE TO EXCESS CALORIES WITHOUT SERIOUS COMORBIDITY WITH BODY MASS INDEX (BMI) OF 38.0 TO 38.9 IN ADULT: Primary | ICD-10-CM

## 2025-07-23 PROCEDURE — 99214 OFFICE O/P EST MOD 30 MIN: CPT | Performed by: NURSE PRACTITIONER

## 2025-07-23 RX ORDER — VENLAFAXINE HYDROCHLORIDE 37.5 MG/1
37.5 CAPSULE, EXTENDED RELEASE ORAL DAILY
Qty: 90 CAPSULE | Refills: 1 | Status: SHIPPED | OUTPATIENT
Start: 2025-07-23

## 2025-07-23 ASSESSMENT — ENCOUNTER SYMPTOMS
VOMITING: 0
SHORTNESS OF BREATH: 0
ABDOMINAL PAIN: 0
CHEST TIGHTNESS: 0
RESPIRATORY NEGATIVE: 1
CONSTIPATION: 0
EYES NEGATIVE: 1
ALLERGIC/IMMUNOLOGIC NEGATIVE: 1
NAUSEA: 0
DIARRHEA: 0
BACK PAIN: 1

## 2025-07-23 ASSESSMENT — PATIENT HEALTH QUESTIONNAIRE - PHQ9
SUM OF ALL RESPONSES TO PHQ QUESTIONS 1-9: 17
10. IF YOU CHECKED OFF ANY PROBLEMS, HOW DIFFICULT HAVE THESE PROBLEMS MADE IT FOR YOU TO DO YOUR WORK, TAKE CARE OF THINGS AT HOME, OR GET ALONG WITH OTHER PEOPLE: VERY DIFFICULT
5. POOR APPETITE OR OVEREATING: MORE THAN HALF THE DAYS
7. TROUBLE CONCENTRATING ON THINGS, SUCH AS READING THE NEWSPAPER OR WATCHING TELEVISION: NEARLY EVERY DAY
4. FEELING TIRED OR HAVING LITTLE ENERGY: NEARLY EVERY DAY
1. LITTLE INTEREST OR PLEASURE IN DOING THINGS: MORE THAN HALF THE DAYS
8. MOVING OR SPEAKING SO SLOWLY THAT OTHER PEOPLE COULD HAVE NOTICED. OR THE OPPOSITE, BEING SO FIGETY OR RESTLESS THAT YOU HAVE BEEN MOVING AROUND A LOT MORE THAN USUAL: MORE THAN HALF THE DAYS
6. FEELING BAD ABOUT YOURSELF - OR THAT YOU ARE A FAILURE OR HAVE LET YOURSELF OR YOUR FAMILY DOWN: MORE THAN HALF THE DAYS
SUM OF ALL RESPONSES TO PHQ QUESTIONS 1-9: 17
2. FEELING DOWN, DEPRESSED OR HOPELESS: SEVERAL DAYS
3. TROUBLE FALLING OR STAYING ASLEEP: MORE THAN HALF THE DAYS
9. THOUGHTS THAT YOU WOULD BE BETTER OFF DEAD, OR OF HURTING YOURSELF: NOT AT ALL

## 2025-07-23 NOTE — PROGRESS NOTES
2025    TELEHEALTH EVALUATION -- Audio/Visual    History of Present Illness    Norma Lutz, a female with an abscess/cyst, depression/anxiety, and weight management concerns, presented following an ER visit where she received antibiotics and oxycodone for an abscess causing limited arm mobility. She reported worsening depression and anxiety over 2-3 weeks with decreased motivation and concentration. Previous sertraline trial was unsuccessful. She was advised to continue antibiotics, apply warm compresses, start venlafaxine 37.5mg daily for mood, and submit prior authorization for Wegovy for weight management.    The patient presents with the followin. She reports visiting the ER yesterday for an abscess or cyst, receiving antibiotics and oxycodone.    2. The patient feels tired and nauseous today, unsure if it's due to the pain medication or antibiotics.    3. She is experiencing significant discomfort from the abscess, preventing her from lifting her arm.    4. The ER physician did not evangelist the abscess, opting for antibiotics to resolve the issue.    5. The patient reports difficulty sleeping due to pain when turning.    6. She describes worsening depression and anxiety over the past 2-3 weeks, with decreased motivation and irritability.    7. The patient expresses difficulty functioning and concentrating at work, sometimes struggling to remember tasks.    8. Her sleep patterns are variable, ranging from 7-8 hours some nights to poor sleep on others.    9. Previous medication trials, including sertraline, were unsuccessful due to side effects.    10. Regarding weight management, the patient reports initial efforts to drink more water but became discouraged due to lack of visible results.    11. She admits to occasional soda consumption and expresses significant dissatisfaction with her appearance.    12. Previous weight loss medication attempts were unsuccessful due to insurance coverage issues and

## 2025-07-23 NOTE — ASSESSMENT & PLAN NOTE
Start Venlafaxine 37.5 mg PO QD    Not interested in Buspar    No longer on sertraline d/t SE  No longer on topamax  No longer on propranolol     Stable.  Chronic.  Continue current treatment plan

## 2025-07-28 DIAGNOSIS — E66.09 CLASS 2 OBESITY DUE TO EXCESS CALORIES WITHOUT SERIOUS COMORBIDITY WITH BODY MASS INDEX (BMI) OF 38.0 TO 38.9 IN ADULT: ICD-10-CM

## 2025-07-28 DIAGNOSIS — E66.812 CLASS 2 OBESITY DUE TO EXCESS CALORIES WITHOUT SERIOUS COMORBIDITY WITH BODY MASS INDEX (BMI) OF 38.0 TO 38.9 IN ADULT: ICD-10-CM
